# Patient Record
Sex: FEMALE | Race: BLACK OR AFRICAN AMERICAN | NOT HISPANIC OR LATINO | URBAN - METROPOLITAN AREA
[De-identification: names, ages, dates, MRNs, and addresses within clinical notes are randomized per-mention and may not be internally consistent; named-entity substitution may affect disease eponyms.]

---

## 2023-11-29 ENCOUNTER — EMERGENCY (EMERGENCY)
Facility: HOSPITAL | Age: 48
LOS: 1 days | Discharge: HOME | End: 2023-11-29
Attending: EMERGENCY MEDICINE | Admitting: EMERGENCY MEDICINE
Payer: MEDICAID

## 2023-11-29 VITALS
TEMPERATURE: 98 F | SYSTOLIC BLOOD PRESSURE: 130 MMHG | HEART RATE: 80 BPM | OXYGEN SATURATION: 100 % | RESPIRATION RATE: 16 BRPM | DIASTOLIC BLOOD PRESSURE: 74 MMHG

## 2023-11-29 VITALS
TEMPERATURE: 98 F | OXYGEN SATURATION: 100 % | HEART RATE: 88 BPM | SYSTOLIC BLOOD PRESSURE: 134 MMHG | RESPIRATION RATE: 16 BRPM | DIASTOLIC BLOOD PRESSURE: 76 MMHG

## 2023-11-29 LAB
ALBUMIN SERPL ELPH-MCNC: 4.2 G/DL — SIGNIFICANT CHANGE UP (ref 3.3–5)
ALBUMIN SERPL ELPH-MCNC: 4.2 G/DL — SIGNIFICANT CHANGE UP (ref 3.3–5)
ALP SERPL-CCNC: 46 U/L — SIGNIFICANT CHANGE UP (ref 40–120)
ALP SERPL-CCNC: 46 U/L — SIGNIFICANT CHANGE UP (ref 40–120)
ALT FLD-CCNC: 25 U/L — SIGNIFICANT CHANGE UP (ref 4–33)
ALT FLD-CCNC: 25 U/L — SIGNIFICANT CHANGE UP (ref 4–33)
ANION GAP SERPL CALC-SCNC: 10 MMOL/L — SIGNIFICANT CHANGE UP (ref 7–14)
ANION GAP SERPL CALC-SCNC: 10 MMOL/L — SIGNIFICANT CHANGE UP (ref 7–14)
AST SERPL-CCNC: 19 U/L — SIGNIFICANT CHANGE UP (ref 4–32)
AST SERPL-CCNC: 19 U/L — SIGNIFICANT CHANGE UP (ref 4–32)
BASOPHILS # BLD AUTO: 0.05 K/UL — SIGNIFICANT CHANGE UP (ref 0–0.2)
BASOPHILS # BLD AUTO: 0.05 K/UL — SIGNIFICANT CHANGE UP (ref 0–0.2)
BASOPHILS NFR BLD AUTO: 0.9 % — SIGNIFICANT CHANGE UP (ref 0–2)
BASOPHILS NFR BLD AUTO: 0.9 % — SIGNIFICANT CHANGE UP (ref 0–2)
BILIRUB SERPL-MCNC: 0.5 MG/DL — SIGNIFICANT CHANGE UP (ref 0.2–1.2)
BILIRUB SERPL-MCNC: 0.5 MG/DL — SIGNIFICANT CHANGE UP (ref 0.2–1.2)
BUN SERPL-MCNC: 9 MG/DL — SIGNIFICANT CHANGE UP (ref 7–23)
BUN SERPL-MCNC: 9 MG/DL — SIGNIFICANT CHANGE UP (ref 7–23)
CALCIUM SERPL-MCNC: 9.6 MG/DL — SIGNIFICANT CHANGE UP (ref 8.4–10.5)
CALCIUM SERPL-MCNC: 9.6 MG/DL — SIGNIFICANT CHANGE UP (ref 8.4–10.5)
CHLORIDE SERPL-SCNC: 104 MMOL/L — SIGNIFICANT CHANGE UP (ref 98–107)
CHLORIDE SERPL-SCNC: 104 MMOL/L — SIGNIFICANT CHANGE UP (ref 98–107)
CO2 SERPL-SCNC: 24 MMOL/L — SIGNIFICANT CHANGE UP (ref 22–31)
CO2 SERPL-SCNC: 24 MMOL/L — SIGNIFICANT CHANGE UP (ref 22–31)
CREAT SERPL-MCNC: 0.76 MG/DL — SIGNIFICANT CHANGE UP (ref 0.5–1.3)
CREAT SERPL-MCNC: 0.76 MG/DL — SIGNIFICANT CHANGE UP (ref 0.5–1.3)
EGFR: 96 ML/MIN/1.73M2 — SIGNIFICANT CHANGE UP
EGFR: 96 ML/MIN/1.73M2 — SIGNIFICANT CHANGE UP
EOSINOPHIL # BLD AUTO: 0.11 K/UL — SIGNIFICANT CHANGE UP (ref 0–0.5)
EOSINOPHIL # BLD AUTO: 0.11 K/UL — SIGNIFICANT CHANGE UP (ref 0–0.5)
EOSINOPHIL NFR BLD AUTO: 2 % — SIGNIFICANT CHANGE UP (ref 0–6)
EOSINOPHIL NFR BLD AUTO: 2 % — SIGNIFICANT CHANGE UP (ref 0–6)
GLUCOSE SERPL-MCNC: 116 MG/DL — HIGH (ref 70–99)
GLUCOSE SERPL-MCNC: 116 MG/DL — HIGH (ref 70–99)
HCT VFR BLD CALC: 37.8 % — SIGNIFICANT CHANGE UP (ref 34.5–45)
HCT VFR BLD CALC: 37.8 % — SIGNIFICANT CHANGE UP (ref 34.5–45)
HGB BLD-MCNC: 12.2 G/DL — SIGNIFICANT CHANGE UP (ref 11.5–15.5)
HGB BLD-MCNC: 12.2 G/DL — SIGNIFICANT CHANGE UP (ref 11.5–15.5)
IANC: 2.03 K/UL — SIGNIFICANT CHANGE UP (ref 1.8–7.4)
IANC: 2.03 K/UL — SIGNIFICANT CHANGE UP (ref 1.8–7.4)
IMM GRANULOCYTES NFR BLD AUTO: 0.2 % — SIGNIFICANT CHANGE UP (ref 0–0.9)
IMM GRANULOCYTES NFR BLD AUTO: 0.2 % — SIGNIFICANT CHANGE UP (ref 0–0.9)
LYMPHOCYTES # BLD AUTO: 2.7 K/UL — SIGNIFICANT CHANGE UP (ref 1–3.3)
LYMPHOCYTES # BLD AUTO: 2.7 K/UL — SIGNIFICANT CHANGE UP (ref 1–3.3)
LYMPHOCYTES # BLD AUTO: 50.1 % — HIGH (ref 13–44)
LYMPHOCYTES # BLD AUTO: 50.1 % — HIGH (ref 13–44)
MAGNESIUM SERPL-MCNC: 1.9 MG/DL — SIGNIFICANT CHANGE UP (ref 1.6–2.6)
MAGNESIUM SERPL-MCNC: 1.9 MG/DL — SIGNIFICANT CHANGE UP (ref 1.6–2.6)
MCHC RBC-ENTMCNC: 29.5 PG — SIGNIFICANT CHANGE UP (ref 27–34)
MCHC RBC-ENTMCNC: 29.5 PG — SIGNIFICANT CHANGE UP (ref 27–34)
MCHC RBC-ENTMCNC: 32.3 GM/DL — SIGNIFICANT CHANGE UP (ref 32–36)
MCHC RBC-ENTMCNC: 32.3 GM/DL — SIGNIFICANT CHANGE UP (ref 32–36)
MCV RBC AUTO: 91.5 FL — SIGNIFICANT CHANGE UP (ref 80–100)
MCV RBC AUTO: 91.5 FL — SIGNIFICANT CHANGE UP (ref 80–100)
MONOCYTES # BLD AUTO: 0.49 K/UL — SIGNIFICANT CHANGE UP (ref 0–0.9)
MONOCYTES # BLD AUTO: 0.49 K/UL — SIGNIFICANT CHANGE UP (ref 0–0.9)
MONOCYTES NFR BLD AUTO: 9.1 % — SIGNIFICANT CHANGE UP (ref 2–14)
MONOCYTES NFR BLD AUTO: 9.1 % — SIGNIFICANT CHANGE UP (ref 2–14)
NEUTROPHILS # BLD AUTO: 2.03 K/UL — SIGNIFICANT CHANGE UP (ref 1.8–7.4)
NEUTROPHILS # BLD AUTO: 2.03 K/UL — SIGNIFICANT CHANGE UP (ref 1.8–7.4)
NEUTROPHILS NFR BLD AUTO: 37.7 % — LOW (ref 43–77)
NEUTROPHILS NFR BLD AUTO: 37.7 % — LOW (ref 43–77)
NRBC # BLD: 0 /100 WBCS — SIGNIFICANT CHANGE UP (ref 0–0)
NRBC # BLD: 0 /100 WBCS — SIGNIFICANT CHANGE UP (ref 0–0)
NRBC # FLD: 0 K/UL — SIGNIFICANT CHANGE UP (ref 0–0)
NRBC # FLD: 0 K/UL — SIGNIFICANT CHANGE UP (ref 0–0)
PLATELET # BLD AUTO: 370 K/UL — SIGNIFICANT CHANGE UP (ref 150–400)
PLATELET # BLD AUTO: 370 K/UL — SIGNIFICANT CHANGE UP (ref 150–400)
POTASSIUM SERPL-MCNC: 3.8 MMOL/L — SIGNIFICANT CHANGE UP (ref 3.5–5.3)
POTASSIUM SERPL-MCNC: 3.8 MMOL/L — SIGNIFICANT CHANGE UP (ref 3.5–5.3)
POTASSIUM SERPL-SCNC: 3.8 MMOL/L — SIGNIFICANT CHANGE UP (ref 3.5–5.3)
POTASSIUM SERPL-SCNC: 3.8 MMOL/L — SIGNIFICANT CHANGE UP (ref 3.5–5.3)
PROT SERPL-MCNC: 7.5 G/DL — SIGNIFICANT CHANGE UP (ref 6–8.3)
PROT SERPL-MCNC: 7.5 G/DL — SIGNIFICANT CHANGE UP (ref 6–8.3)
RBC # BLD: 4.13 M/UL — SIGNIFICANT CHANGE UP (ref 3.8–5.2)
RBC # BLD: 4.13 M/UL — SIGNIFICANT CHANGE UP (ref 3.8–5.2)
RBC # FLD: 13.2 % — SIGNIFICANT CHANGE UP (ref 10.3–14.5)
RBC # FLD: 13.2 % — SIGNIFICANT CHANGE UP (ref 10.3–14.5)
SODIUM SERPL-SCNC: 138 MMOL/L — SIGNIFICANT CHANGE UP (ref 135–145)
SODIUM SERPL-SCNC: 138 MMOL/L — SIGNIFICANT CHANGE UP (ref 135–145)
WBC # BLD: 5.39 K/UL — SIGNIFICANT CHANGE UP (ref 3.8–10.5)
WBC # BLD: 5.39 K/UL — SIGNIFICANT CHANGE UP (ref 3.8–10.5)
WBC # FLD AUTO: 5.39 K/UL — SIGNIFICANT CHANGE UP (ref 3.8–10.5)
WBC # FLD AUTO: 5.39 K/UL — SIGNIFICANT CHANGE UP (ref 3.8–10.5)

## 2023-11-29 PROCEDURE — 70498 CT ANGIOGRAPHY NECK: CPT | Mod: 26,MA

## 2023-11-29 PROCEDURE — 70496 CT ANGIOGRAPHY HEAD: CPT | Mod: 26,MA

## 2023-11-29 PROCEDURE — 93010 ELECTROCARDIOGRAM REPORT: CPT

## 2023-11-29 PROCEDURE — 99285 EMERGENCY DEPT VISIT HI MDM: CPT

## 2023-11-29 RX ORDER — SODIUM CHLORIDE 9 MG/ML
1000 INJECTION, SOLUTION INTRAVENOUS ONCE
Refills: 0 | Status: COMPLETED | OUTPATIENT
Start: 2023-11-29 | End: 2023-11-29

## 2023-11-29 RX ADMIN — SODIUM CHLORIDE 1000 MILLILITER(S): 9 INJECTION, SOLUTION INTRAVENOUS at 10:09

## 2023-11-29 NOTE — ED PROVIDER NOTE - PROGRESS NOTE DETAILS
results noted and are negative  patient notes sx have resolved   will follow with pmd   return prec discussed.   neuro follow up if this recurs recommended

## 2023-11-29 NOTE — ED ADULT NURSE NOTE - OBJECTIVE STATEMENT
Pt received in room 4. Pt AxOx4, ambulatory. Pt complaining of lightheadedness and bilateral facial numbness for 1 week. Pt also complaining of seeing "bubbles" for the past 2 days. Pt states she saw her PCP yesterday and was referred to the ED. No facial droop, or slurred speech. Bilateral upper and lower extremities equal strength. Pt denies history of CVA. Pt denies SOB, chest pain. 20G PIV in LAC placed. Pending CT head to r/o CVA

## 2023-11-29 NOTE — ED PROVIDER NOTE - NSFOLLOWUPINSTRUCTIONS_ED_ALL_ED_FT
You were seen in the ER for lightheadedness    Your blood work and CT scans were normal     Although we are sending you home, no ER evaluation is complete without outpatient follow-up. Follow up with your regular doctor within the next 1 week. Return to the ER for worsening symptoms, weakness, vision change, or any other concerns

## 2023-11-29 NOTE — ED PROVIDER NOTE - OBJECTIVE STATEMENT
48-year-old female no past medical history presents to the ED after being referred from her primary care doctor to evaluate for TIA/stroke.  Patient notes over the past 4 to 5 days she has had intermittent facial tingling, right lower extremity tingling, changes in her vision that have since resolved.  She saw her primary care doctor yesterday who was concerned about the symptoms and sent her to the ED for further evaluation.  Patient had childcare needs and was able to come to the ED this morning.  Patient has had similar symptoms in the past was seen in [place] was diagnosed with dehydration.  Patient does note that she felt dehydrated last week.  Her only symptom at the time of my evaluation is lightheadedness that is worsened with position changes.  She also notes intermittent feelings of dry mouth.  She feels that she is able to see things normally when she has her bouts of visual complaints but feels as if things are not normal she has a feeling of derealization.  No headache, no neck pain, no fever, no chills, no chest pain

## 2023-11-29 NOTE — ED ADULT NURSE NOTE - ISOLATION TYPE:
Patient was scheduled for a telephone visit however all she is really asking for is a referral to Pain Management for her chronic multiple joint pains.  This was provided.  No further concerns at this time.    None

## 2023-11-29 NOTE — ED ADULT TRIAGE NOTE - CHIEF COMPLAINT QUOTE
Pt ambulatory to triage c/o intermittent blurry vision, seeing "bubbles" since Monday. Pt also c/o lightheadedness, facial and leg numbness x 1 week. Pt seen by PCP yesterday, referred to ED to rule out CVA. No facial droop or slurred speech. Equal strong strength bilaterally. Denies headache. PHx sinus infections

## 2023-11-29 NOTE — ED PROVIDER NOTE - CLINICAL SUMMARY MEDICAL DECISION MAKING FREE TEXT BOX
48-year-old female presents to the ED with intermittent facial/extremity tingling, lightheadedness, vision change.  Examination in the ED is normal.  Patient sent to the ED by her primary care doctor to evaluate for CVA.  Will perform CT/CTA to evaluate for stroke/mass as posterior CVA could explain some of her symptoms.  However patient does not have any stroke risk factors and her symptoms have resolved.  Suspect symptoms are related to volume depletion, will check basic labs and administer IV fluids.

## 2023-11-29 NOTE — ED PROVIDER NOTE - ATTENDING CONTRIBUTION TO CARE
Gina: I have seen and examined the patient face to face, have reviewed and addended the HPI, PE and a/p as necessary.    49 yo F with no PMH a/w facial tingling, and RLE tingling, with "bubbles in her vision" which has now resolved.  Pt reports she went to her PCP yesterday who referred her to the ED for further evaluation.  Pt reports only some lightheadedness with no vertigo symptoms.  Noted to have similar symptoms in the past was seen in the hospital in her [place] [facility] was diagnosed with dehydration.  Patient does note that she felt dehydrated last week.  No headache, no neck pain, no fever, no chills, no chest pain.    GEN - NAD; well appearing; A+O x3; non-toxic appearing  HEAD: NCAT; EYES/NOSE: PERRLA, EOMI, no discharge; THROAT: Oral cavity and pharynx normal. No inflammation, swelling, exudate, or lesions.   CARD -s1s2, RRR, no M,G,R;   PULM - CTA b/l, symmetric breath sounds;   ABD -  +BS, ND, NT, soft, no guarding, no rebound, no masses;   BACK - no CVA tenderness, Normal  spine;   EXT - symmetric pulses, 2+ dp, capillary refill < 2 seconds, no cyanosis, no edema;   NEURO: alert, CN 2-12 intact, coordination nl, finger to nose nl, romberg negative, motor 5/5 RUE/LUE/RLE/LLE/EHL/Plantar flexion, no pronator drift, gait nl; decreased sensation in RLE below the knee Gina: I have seen and examined the patient face to face, have reviewed and addended the HPI, PE and a/p as necessary.    49 yo F with no PMH a/w facial tingling, and RLE tingling, with "bubbles in her vision" which has now resolved.  Pt reports she went to her PCP yesterday who referred her to the ED for further evaluation.  Pt reports only some lightheadedness with no vertigo symptoms.  Noted to have similar symptoms in the past was seen in the hospital [place] [facility] was diagnosed with dehydration.  Patient does note that she felt dehydrated last week.  No headache, no neck pain, no fever, no chills, no chest pain.    GEN - NAD; well appearing; A+O x3; non-toxic appearing  HEAD: NCAT; EYES/NOSE: PERRLA, EOMI, no discharge; THROAT: Oral cavity and pharynx normal. No inflammation, swelling, exudate, or lesions.   CARD -s1s2, RRR, no M,G,R;   PULM - CTA b/l, symmetric breath sounds;   ABD -  +BS, ND, NT, soft, no guarding, no rebound, no masses;   BACK - no CVA tenderness, Normal  spine;   EXT - symmetric pulses, 2+ dp, capillary refill < 2 seconds, no cyanosis, no edema;   NEURO: alert, CN 2-12 intact, coordination nl, finger to nose nl, romberg negative, motor 5/5 RUE/LUE/RLE/LLE/EHL/Plantar flexion, no pronator drift, gait nl; decreased sensation in RLE below the knee    49 yo F a/w intermittent facial/extremity tingling, lightheadedness, vision change; noted in ED to have non-focal exam with no focal symptoms. Sent in by PCP for eval for CVA; Will perform CT/CTA to evaluate for stroke/mass as posterior CVA could explain some of her symptoms.  More likely for cause is possible dehydration vs electrolyte issues, will give IVF and follow up labs.  Will reevaluate and reassess.

## 2023-11-29 NOTE — ED PROVIDER NOTE - NS ED ROS FT
CONSTITUTIONAL: no fever or chills. No weakness    Eyes: No visual change  · ENMT: no runny nose or sore throat  · CARDIOVASCULAR: no chest pain   · RESPIRATORY:  no cough or  shortness of breath.  · GASTROINTESTINAL: No abdominal pain, no nausea,  no diarrhea, no vomiting  · GENITOURINARY: no dysuria or increased urinary frequency  · MUSCULOSKELETAL: no back pain    NEURO: No weakness, no headache   · ROS STATEMENT: all other ROS negative except as per HPI

## 2023-11-29 NOTE — ED PROVIDER NOTE - PHYSICAL EXAMINATION
CONSTITUTIONAL: Well appearing, awake, alert  ENMT: Airway patent, tolerating secretions.    NECK: Supple. No JVD  EYES: Clear bilaterally, pupils equal, round  CARDIAC: Warm, well-perfused  RESPIRATORY: Equal Chest Rise, no stridor. No respiratory distress.   GASTROINTESTINAL: Non-distended  MUSCULOSKELETAL: No gross joint deformity   NEUROLOGICAL: Alert. Speech Fluent. Attends to conversation and exam  Extra-ocular motions intact to abduction, adduction, upward gaze, downward gaze  Sensation intact along V1-V3 distributions  No facial droop, able to smile and wrinkle forehead with full strength   able to stick out tongue without difficulty   finger nose normal.   Gait normal.   able to perform tandem gait without difficulty   5/5 strength, symmetric in UE and LE  SKIN:  No erythema    PSYCHIATRIC: Normal affect. Cooperative with history and exam

## 2023-11-29 NOTE — ED ADULT NURSE NOTE - BEFAST SPEECH SLURRED
Call returned to pt. Pt reports [facility] sent her a letter saying she needs a virtual appt with Dr. Guerin. Pt currently scheduled for in person visit in December. Pt asked writer to call [facility] to clarify what is needed. Call made to [facility]. No answer. Detailed voicemail left requesting call back.   
Messaged received from [facility]. In person appointment with Dr. Guerin is okay. Pt notified and verbalized understanding.   
No

## 2023-11-29 NOTE — ED PROVIDER NOTE - PATIENT PORTAL LINK FT
You can access the FollowMyHealth Patient Portal offered by [facility] by registering at the following website: [email or web address]. By joining [facility] FollowMyHealth portal, you will also be able to view your health information using other applications (apps) compatible with our system.